# Patient Record
Sex: FEMALE | Race: OTHER | HISPANIC OR LATINO | Employment: UNEMPLOYED | URBAN - METROPOLITAN AREA
[De-identification: names, ages, dates, MRNs, and addresses within clinical notes are randomized per-mention and may not be internally consistent; named-entity substitution may affect disease eponyms.]

---

## 2021-07-30 ENCOUNTER — HOSPITAL ENCOUNTER (EMERGENCY)
Facility: HOSPITAL | Age: 36
Discharge: HOME | End: 2021-07-30
Attending: EMERGENCY MEDICINE
Payer: MEDICAID

## 2021-07-30 VITALS
HEART RATE: 93 BPM | RESPIRATION RATE: 20 BRPM | WEIGHT: 264 LBS | OXYGEN SATURATION: 97 % | HEIGHT: 68 IN | BODY MASS INDEX: 40.01 KG/M2 | DIASTOLIC BLOOD PRESSURE: 96 MMHG | TEMPERATURE: 98.6 F | SYSTOLIC BLOOD PRESSURE: 153 MMHG

## 2021-07-30 DIAGNOSIS — O03.9 COMPLETE MISCARRIAGE: ICD-10-CM

## 2021-07-30 DIAGNOSIS — R03.0 SINGLE EPISODE OF ELEVATED BLOOD PRESSURE: Primary | ICD-10-CM

## 2021-07-30 LAB
ABO GROUP BLD: NORMAL
B-HCG SERPL-ACNC: 3 MIU/ML
BLD GP AB SCN SERPL QL: NEGATIVE
HOLD SPECIMEN: NORMAL
RH BLD: POSITIVE
SPECIMEN EXPIRATION DATE: NORMAL

## 2021-07-30 PROCEDURE — 99284 EMERGENCY DEPT VISIT MOD MDM: CPT | Performed by: EMERGENCY MEDICINE

## 2021-07-30 PROCEDURE — 84702 CHORIONIC GONADOTROPIN TEST: CPT | Performed by: EMERGENCY MEDICINE

## 2021-07-30 PROCEDURE — 86900 BLOOD TYPING SEROLOGIC ABO: CPT | Performed by: EMERGENCY MEDICINE

## 2021-07-30 PROCEDURE — 86901 BLOOD TYPING SEROLOGIC RH(D): CPT | Performed by: EMERGENCY MEDICINE

## 2021-07-30 PROCEDURE — 36415 COLL VENOUS BLD VENIPUNCTURE: CPT | Performed by: EMERGENCY MEDICINE

## 2021-07-30 PROCEDURE — 99284 EMERGENCY DEPT VISIT MOD MDM: CPT

## 2021-07-30 PROCEDURE — 86850 RBC ANTIBODY SCREEN: CPT | Performed by: EMERGENCY MEDICINE

## 2021-07-30 PROCEDURE — 76815 OB US LIMITED FETUS(S): CPT | Performed by: EMERGENCY MEDICINE

## 2021-07-30 NOTE — ED ATTENDING ATTESTATION
Final Diagnosis:  1  Single episode of elevated blood pressure    2  Complete miscarriage      ED Course as of Aug 01 1647   Fri 2021   6905 Bedside U/S done with the resident  Abdominal    There's a thickened endometrium with an endometrial stripe but no definitive IUP  Benjamin Murphy MD, saw and evaluated the patient  All available labs and X-rays were ordered by me or the resident and have been reviewed by myself  I discussed the patient with the resident / non-physician and agree with the resident's / non-physician practitioner's findings and plan as documented in the resident's / non-physician practicitioner's note, except where noted  At this point, I agree with the current assessment done in the ED  I was present during key portions of all procedures performed unless otherwise stated  Chief Complaint   Patient presents with    Vaginal Bleeding - Pregnant     is apx 5-8weeks with bleeding the last 3 days, using only one pad a day  some cramping  This is a 28 y o  female presenting for evaluation of belly pain  She has been bleeding for maybe 3 days, about 1 pad a day  She has pain that feels like a menses cycle mixed with a "uterus stretching" sensation  No f/ch/n/v/cp/sob   +stringy clots, like "mucous plugs"  Denies any urinary tract infection symptoms (burning, itching, pain, blood, frequency)  [id number]  She had pre-eclampsia with the first  Had bleeding with the second one for 1-2 weeks but it felt less than this  No invitro fertilization  PMH:   has a past medical history of Asthma and Diabetes mellitus ([address] )  PSH:   has a past surgical history that includes  section      Social:  Social History     Substance and Sexual Activity   Alcohol Use Not Currently     Social History     Tobacco Use   Smoking Status Never Smoker   Smokeless Tobacco Never Used     Social History     Substance and Sexual Activity   Drug Use Never     PE:  Vitals:    21 6129 07/30/21 0830 07/30/21 1046   BP:  (!) 209/130 153/96   BP Location:   Right arm   Pulse: 101  93   Resp: 20     Temp:  98 6 °F (37 °C)    TempSrc:  Oral    SpO2: 96%  97%   Weight: 120 kg (264 lb)     Height: 5' 7 5" (1 715 m)     General: VSS, NAD, awake, alert  Well-nourished, well-developed  Appears stated age  Head: Normocephalic, atraumatic, nontender  Eyes: PERRL, EOM-I  No diplopia  No hyphema  No subconjunctival hemorrhages  Symmetrical lids  ENTAtraumatic external nose and ears  MMM  No stridor  Normal phonation  No drooling  Base of mouth is soft  No mastoid tenderness  Neck: Symmetric, trachea midline  No JVD  CV: Peripheral pulses +2 throughout  No chest wall tenderness  Lungs:   Unlabored   No retractions  No crepitus  No tachypnea  No paradoxical motion  Abd: +BS, soft, NT/ND  Obese   MSK:   FROM   No lower extremity edema  Back:   No CVAT  Skin: Dry, intact  Neuro: AAOx3, GCS 15, CN II-XII grossly intact  Motor grossly intact  Psychiatric/Behavioral: Appropriate mood and affect   Exam: deferred  A:  - First trimester vaginal bleeding  P:  - As patient has no confirmed IUP and is pregnant given the urine pregnancy test, will do beta-HCG  - Will do U/S  - Attempted transabdominal U/S: couldn't find IUP  Will get transvaginal ultrasound for R/O ectopic pregnancy  - Reviewed with patient that first trimester bleeding occurs in 20% of women  Reviewed that of these 20%, 50% will miscarry, 50% will not miscarry  - Lastly, reviewed with patient that if she has an IUP with a FHR, this drops the 50% miscarriage rate to only 1%     - Review of EPIC --> The patient has a documented Rh documented for possible RhoGam  0   Lab Value Date/Time    RH Positive 07/30/2021 0902     - If no obvious pregnancy on U/S, will discuss 48-hour beta-HCG testing for trending    - She is from [place], currently dog sitting   - 13 point ROS was performed and all are normal unless stated in the history above  - Nursing note reviewed  Vitals reviewed  - Orders placed by myself and/or advanced practitioner / resident     - Previous chart was reviewed  - No language barrier    - History obtained from patient  - There are no limitations to the history obtained  - Critical care time: Not applicable for this patient  Code Status: No Order  Advance Directive and Living Will:      Power of :    POLST:      Medications - No data to display  No orders to display     Orders Placed This Encounter   Procedures    hCG, quantitative    Type and screen     Labs Reviewed   HCG, QUANTITATIVE - Normal       Result Value Ref Range Status    HCG, Quant 3  <=6 mIU/mL Final    Narrative:      Expected Ranges:     Approximate               Approximate HCG  Gestation age          Concentration ( mIU/mL)  _____________          ______________________   Marcine Alt                      HCG values  0 2-1                       5-50  1-2                           2-3                         100-5000  3-4                         500-36503  4-5                         1000-13103  5-6                         28586-057674  6-8                         [id number]  8-12                        [id number]     TYPE AND SCREEN    ABO Grouping A   Final    Rh Factor Positive   Final    Antibody Screen Negative   Final    Specimen Expiration Date [id number]   Final   LAVENDER TOP 3 ML ON HOLD    Extra Tube Hold for add-ons  Final    Comment: Auto resulted       Time reflects when diagnosis was documented in both MDM as applicable and the Disposition within this note       Time User Action Codes Description Comment    7/30/2021 10:43 AM Michelle Padilla Add [R03 0] Single episode of elevated blood pressure     7/30/2021 10:43 AM Michelle Padilla Add [O03 9] Complete miscarriage           ED Disposition       ED Disposition Condition Date/Time Comment    Discharge Good Fri Jul 30, 2021 10:43 AM Nickolas Stahl discharge to home/self care  Follow-up Information       Follow up With Specialties Details Why Contact Info Additional [address] Emergency Department Emergency Medicine Go to  As needed [address]  [address] Emergency Department, [address] [place], [place], [address] [phone]          There are no discharge medications for this patient  No discharge procedures on file  None       Portions of the record may have been created with voice recognition software  Occasional wrong word or "sound a like" substitutions may have occurred due to the inherent limitations of voice recognition software  Read the chart carefully and recognize, using context, where substitutions have occurred      Electronically signed by:  Fidel Escobar

## 2021-07-30 NOTE — DISCHARGE INSTRUCTIONS
Please follow up with your ob/gyn when you return back home  Return to the ER if you have any severe pain, severe vaginal bleeding, lightheadedness or dizziness, passing out episodes

## 2021-08-04 NOTE — ED PROVIDER NOTES
History  Chief Complaint   Patient presents with    Vaginal Bleeding - Pregnant     is apx 5-8weeks with bleeding the last 3 days, using only one pad a day  some cramping  27 yo F  at approximately 6 wks gestation (unsure exactly of LMP) presenting for vatinal bleeding for the past 3 days  Going through about 1-2 pads per day, feels like a period or maybe a little less  Also having some lower abdominal cramping which also feels like a period  Not on one side more than another  No significant lightheadedness, dizziness, chest pain, sob, n/v  No IVF  Has had h/o bleeding in early pregnancies  No history of miscarriages or bleeding disorders  History provided by:  Patient   used: No        None       Past Medical History:   Diagnosis Date    Asthma     Diabetes mellitus ([facility] [address] )     Type 2        Past Surgical History:   Procedure Laterality Date     SECTION         History reviewed  No pertinent family history  I have reviewed and agree with the history as documented  E-Cigarette/Vaping    E-Cigarette Use Never User      E-Cigarette/Vaping Substances     Social History     Tobacco Use    Smoking status: Never Smoker    Smokeless tobacco: Never Used   Vaping Use    Vaping Use: Never used   Substance Use Topics    Alcohol use: Not Currently    Drug use: Never        Review of Systems   Constitutional: Negative for chills, fatigue and fever  HENT: Negative for sore throat  Eyes: Negative for redness and visual disturbance  Respiratory: Negative for shortness of breath  Cardiovascular: Negative for chest pain  Gastrointestinal: Positive for abdominal pain  Negative for diarrhea, nausea and vomiting  Genitourinary: Positive for vaginal bleeding  Negative for difficulty urinating and dysuria  Musculoskeletal: Negative for back pain and gait problem  Skin: Negative for pallor and rash  Neurological: Negative for syncope, weakness and headaches  Psychiatric/Behavioral: Negative for confusion  The patient is not nervous/anxious  All other systems reviewed and are negative  Physical Exam  ED Triage Vitals   Temperature Pulse Respirations Blood Pressure SpO2   07/30/21 0830 07/30/21 0816 07/30/21 0816 07/30/21 0830 07/30/21 0816   98 6 °F (37 °C) 101 20 (!) 209/130 96 %      Temp Source Heart Rate Source Patient Position - Orthostatic VS BP Location FiO2 (%)   07/30/21 0830 -- -- 07/30/21 1046 --   Oral   Right arm       Pain Score       07/30/21 0816       6             Orthostatic Vital Signs  Vitals:    07/30/21 0816 07/30/21 0830 07/30/21 1046   BP:  (!) 209/130 153/96   Pulse: 101  93       Physical Exam  Vitals and nursing note reviewed  Constitutional:       General: She is not in acute distress  Appearance: Normal appearance  She is not ill-appearing or diaphoretic  HENT:      Head: Normocephalic and atraumatic  Right Ear: External ear normal       Left Ear: External ear normal       Nose: Nose normal       Mouth/Throat:      Mouth: Mucous membranes are moist       Pharynx: Oropharynx is clear  Eyes:      Conjunctiva/sclera: Conjunctivae normal       Pupils: Pupils are equal, round, and reactive to light  Cardiovascular:      Rate and Rhythm: Normal rate and regular rhythm  Heart sounds: No murmur heard  Pulmonary:      Effort: Pulmonary effort is normal  No respiratory distress  Breath sounds: Normal breath sounds  No wheezing or rales  Abdominal:      General: Abdomen is flat  There is no distension  Tenderness: There is no abdominal tenderness  There is no guarding  Musculoskeletal:         General: Normal range of motion  Cervical back: Normal range of motion and neck supple  Right lower leg: No edema  Left lower leg: No edema  Skin:     General: Skin is warm and dry  Coloration: Skin is not pale  Neurological:      General: No focal deficit present        Mental Status: She is alert     Psychiatric:         Mood and Affect: Mood normal          ED Medications  Medications - No data to display    Diagnostic Studies  Results Reviewed     Procedure Component Value Units Date/Time    hCG, quantitative [[id number]]  (Normal) Collected: 07/30/21 0902    Lab Status: Final result Specimen: Blood from Arm, Left Updated: 07/30/21 0937     HCG, Quant 3 mIU/mL     Narrative:       Expected Ranges:     Approximate               Approximate HCG  Gestation age          Concentration ( mIU/mL)  _____________          ______________________   Shelli Jerry                      HCG values  0 2-1                       5-50  1-2                           2-3                         100-5000  3-4                         500-34423  4-5                         1000-41964  5-6                         48880-898982  6-8                         29370-372039  8-12                        15715-598744                   No orders to display         Procedures  POC Pelvic US    Date/Time: 7/30/2021 8:55 AM  Performed by: Paradise Newby MD  Authorized by: Paradise Newby MD     Patient location:  ED  Other Assisting Provider: Yes (comment) (Dr Juma Rivers)    Procedure details:     Exam Type:  Diagnostic    Indications: evaluate for IUP      Assessment for: confirm intrauterine pregnancy      Technique:  Transabdominal obstetric (HCG+) exam    Views obtained: uterus (transverse and sagittal)      Image quality: non-diagnostic      Image availability:  Images available in PACS  Uterine findings:     Endometrial stripe: identified      Intrauterine pregnancy: not identified    Other findings:     Free pelvic fluid: not identified      Free peritoneal fluid: not identified    Interpretation:     Ultrasound impressions: indeterminate      Pregnancy findings: indeterminate    Comments:      Could not confirm IUP          ED Course                                       MDM  Number of Diagnoses or Management Options  Complete miscarriage  Single episode of elevated blood pressure  Diagnosis management comments: 29 yo F presenting for vaginal bleeding with positive urine pregnancy test at home, approximately 6 weeks gestation based on patient's calculations  VS normal other than elevated blood pressure at presentation which improved on recheck, will f/u with PCP for this  Bedside US unable to confirm IUP  Rh+  HCG of 3 today, consistent with false positive pregnancy test at home or more likely completed miscarriage  Counseled pt about these results  Will f/u with her ob/gyn  Discussed return precautions  Discharged  Disposition  Final diagnoses:   Single episode of elevated blood pressure   Complete miscarriage     Time reflects when diagnosis was documented in both MDM as applicable and the Disposition within this note     Time User Action Codes Description Comment    7/30/2021 10:43 AM Betty Padilla Add [R03 0] Single episode of elevated blood pressure     7/30/2021 10:43 AM Betty Padilla Add [O03 9] Complete miscarriage       ED Disposition     ED Disposition Condition Date/Time Comment    Discharge Good Fri Jul 30, 2021 10:43 AM Horace Hills discharge to home/self care  Follow-up Information     Follow up With Specialties Details Why Contact Info Additional [address] Emergency Department Emergency Medicine Go to  As needed [address]  [address] Emergency Department, [address], [address]   [phone]          There are no discharge medications for this patient  No discharge procedures on file  PDMP Review     None           ED Provider  Attending physically available and evaluated Horace Hills I managed the patient along with the ED Attending      Electronically Signed by         Yanira Rodríguez MD  08/04/21 6631